# Patient Record
Sex: FEMALE | Race: WHITE | Employment: FULL TIME | URBAN - METROPOLITAN AREA
[De-identification: names, ages, dates, MRNs, and addresses within clinical notes are randomized per-mention and may not be internally consistent; named-entity substitution may affect disease eponyms.]

---

## 2017-02-02 ENCOUNTER — TRANSFERRED RECORDS (OUTPATIENT)
Dept: HEALTH INFORMATION MANAGEMENT | Facility: CLINIC | Age: 42
End: 2017-02-02

## 2017-02-02 LAB
HPV ABSTRACT: NORMAL
PAP-ABSTRACT: NORMAL

## 2018-04-16 ENCOUNTER — OFFICE VISIT (OUTPATIENT)
Dept: FAMILY MEDICINE | Facility: CLINIC | Age: 43
End: 2018-04-16
Payer: COMMERCIAL

## 2018-04-16 VITALS
OXYGEN SATURATION: 100 % | BODY MASS INDEX: 28.13 KG/M2 | TEMPERATURE: 98.3 F | DIASTOLIC BLOOD PRESSURE: 78 MMHG | SYSTOLIC BLOOD PRESSURE: 116 MMHG | HEIGHT: 64 IN | RESPIRATION RATE: 15 BRPM | WEIGHT: 164.8 LBS | HEART RATE: 78 BPM

## 2018-04-16 DIAGNOSIS — F41.1 GAD (GENERALIZED ANXIETY DISORDER): ICD-10-CM

## 2018-04-16 DIAGNOSIS — N95.1 PERIMENOPAUSE: Primary | ICD-10-CM

## 2018-04-16 DIAGNOSIS — Z17.0 MALIGNANT NEOPLASM OF UPPER-OUTER QUADRANT OF RIGHT BREAST IN FEMALE, ESTROGEN RECEPTOR POSITIVE (H): ICD-10-CM

## 2018-04-16 DIAGNOSIS — C50.411 MALIGNANT NEOPLASM OF UPPER-OUTER QUADRANT OF RIGHT BREAST IN FEMALE, ESTROGEN RECEPTOR POSITIVE (H): ICD-10-CM

## 2018-04-16 PROCEDURE — 99202 OFFICE O/P NEW SF 15 MIN: CPT | Performed by: FAMILY MEDICINE

## 2018-04-16 RX ORDER — CITALOPRAM HYDROBROMIDE 20 MG/1
TABLET ORAL
COMMUNITY
Start: 2015-05-06 | End: 2018-04-16

## 2018-04-16 RX ORDER — BUPROPION HYDROCHLORIDE 150 MG/1
TABLET ORAL
Refills: 3 | COMMUNITY
Start: 2018-04-08

## 2018-04-16 RX ORDER — GABAPENTIN 300 MG/1
300 CAPSULE ORAL AT BEDTIME
Qty: 90 CAPSULE | Refills: 3 | Status: SHIPPED | OUTPATIENT
Start: 2018-04-16

## 2018-04-16 RX ORDER — CITALOPRAM HYDROBROMIDE 10 MG/1
30 TABLET ORAL DAILY
Qty: 90 TABLET | Refills: 3 | COMMUNITY
Start: 2018-04-16

## 2018-04-16 NOTE — Clinical Note
Pt had pap done on this date 02/02/2017, with [place] [facility] were normal. Please abstract.

## 2018-04-16 NOTE — PROGRESS NOTES
SUBJECTIVE:   Kelle Burks is a 42 year old female who presents to clinic today for the following health issues:      Chief Complaint   Patient presents with     Consult     Would like to meet you- has new insurance and is looking for a new GP.  Would like to get Rx for Gabapentin for hot flashes.  She has been given this before due to Chemo induced menopause.     She is here to establish care.  Has had her routine Pap smears through her OB/GYN at [facility].  Her last Pap smear was normal and is noted below she is up-to-date on all other routine well adult cares.    History of breast cancer on the right side.  She did have lumpectomy with no regional lymph nodes had radiation and chemotherapy and 5-6 years of tamoxifen and now is off of all medications but unfortunately is having menopausal symptoms as a result.  Gabapentin has worked well for her.  She usually has taken 300 mg at night.  She has regular breast screening every 6 months to a year.      PAP TEST (02/02/2017 2:43 PM)  PAP TEST (02/02/2017 2:43 PM)   Component Value Ref Range   Pap Final   [facility]                                                  Accession Number:   [id number]        Source:  ThinPrep liquid-based Pap test      Final Interpretation:  NEGATIVE FOR INTRAEPITHELIAL LESION OR MALIGNANCY    High-risk HPV co-testing will be performed and separately reported by the  [facility].    *  Report Electronically Signed By  *     Jayla Hyde MBA, CT(ASCP)     Screening Performed By:     Jayla Hyde MBA, CT(ASCP)      02.06.2017 8:54      Diagnosis Comment:  _  This Pap test was screened by the ThinPrep Imaging System ([employer]  prior to manual review by a cytotechnologist and/or pathologist.  -  This Pap test is a screening test with inherent false negative and false  positive results and should not be used as the sole means to detect  cervical cancer.      Adequacy Interpretation:  Satisfactory for  "evaluation, endocervical component identified.              Problem list and histories reviewed & adjusted, as indicated.  Additional history: as documented    Patient Active Problem List   Diagnosis     Perimenopause     Malignant neoplasm of upper-outer quadrant of right breast in female, estrogen receptor positive (H)     TJ (generalized anxiety disorder)     Past Surgical History:   Procedure Laterality Date     CYSTECTOMY OVARIAN BENIGN  2001     LUMPECTOMY BREAST  2010       Social History   Substance Use Topics     Smoking status: Never Smoker     Smokeless tobacco: Never Used     Alcohol use Yes      Comment: socially     History reviewed. No pertinent family history.      Current Outpatient Prescriptions   Medication Sig Dispense Refill     buPROPion (WELLBUTRIN XL) 150 MG 24 hr tablet TK 3 TS PO ONCE DAILY  3     gabapentin (NEURONTIN) 300 MG capsule Take 1 capsule (300 mg) by mouth At Bedtime 90 capsule 3     citalopram (CELEXA) 10 MG tablet Take 3 tablets (30 mg) by mouth daily 90 tablet 3     [DISCONTINUED] citalopram (CELEXA) 20 MG tablet TAKE 1 TABLET BY MOUTH DAILY       [DISCONTINUED] BuPROPion HCl (WELLBUTRIN PO) Take 300 mg by mouth       [DISCONTINUED] Citalopram Hydrobromide (CELEXA PO) Take 20 mg by mouth         Reviewed and updated as needed this visit by clinical staff  Tobacco  Meds  Soc Hx      Reviewed and updated as needed this visit by Provider         ROS:  Constitutional, HEENT, cardiovascular, pulmonary, gi and gu systems are negative, except as otherwise noted.    OBJECTIVE:                                                    /78  Pulse 78  Temp 98.3  F (36.8  C) (Oral)  Resp 15  Ht 5' 4\" (1.626 m)  Wt 164 lb 12.8 oz (74.8 kg)  LMP 02/16/2018  SpO2 100%  Breastfeeding? No  BMI 28.29 kg/m2  Body mass index is 28.29 kg/(m^2).  GENERAL APPEARANCE: healthy, alert and no distress         ASSESSMENT/PLAN:                                                    1. " Perimenopause    - gabapentin (NEURONTIN) 300 MG capsule; Take 1 capsule (300 mg) by mouth At Bedtime  Dispense: 90 capsule; Refill: 3    2. Malignant neoplasm of upper-outer quadrant of right breast in female, estrogen receptor positive (H)      3. TJ (generalized anxiety disorder)  Meds reviewd and updated - sees psychiatry      Follow up with Provider - as needed for WWE later this year     Kelle López MD  [facility]

## 2018-04-16 NOTE — MR AVS SNAPSHOT
"              After Visit Summary   2018    Kelle Burks    MRN: [id number]           Patient Information     Date Of Birth          1975        Visit Information        Provider Department      2018 3:00 PM Kelle López MD [facility]        Today's Diagnoses     Perimenopause    -  1    Malignant neoplasm of upper-outer quadrant of right breast in female, estrogen receptor positive (H)        TJ (generalized anxiety disorder)           Follow-ups after your visit        Who to contact     If you have questions or need follow up information about today's clinic visit or your schedule please contact [facility] directly at [phone].  Normal or non-critical lab and imaging results will be communicated to you by MyChart, letter or phone within 4 business days after the clinic has received the results. If you do not hear from us within 7 days, please contact the clinic through MyChart or phone. If you have a critical or abnormal lab result, we will notify you by phone as soon as possible.  Submit refill requests through [employer] or call your pharmacy and they will forward the refill request to us. Please allow 3 business days for your refill to be completed.          Additional Information About Your Visit        MyChart Information     [employer] lets you send messages to your doctor, view your test results, renew your prescriptions, schedule appointments and more. To sign up, go to [email or web address] . Click on \"Log in\" on the left side of the screen, which will take you to the Welcome page. Then click on \"Sign up Now\" on the right side of the page.     You will be asked to enter the access code listed below, as well as some personal information. Please follow the directions to create your username and password.     Your access code is: [email or web address]  Expires: 7/15/2018  3:51 PM     Your access code will  in 90 days. If you need help or a new code, please " "call your [facility] or [phone].        Care EveryWhere ID     This is your Care EveryWhere ID. This could be used by other organizations to access your [facility] medical records  [id number]        Your Vitals Were     Pulse Temperature Respirations Height Last Period Pulse Oximetry    78 98.3  F (36.8  C) (Oral) 15 5' 4\" (1.626 m) 02/16/2018 100%    Breastfeeding? BMI (Body Mass Index)                No 28.29 kg/m2           Blood Pressure from Last 3 Encounters:   04/16/18 116/78   12/13/14 113/79    Weight from Last 3 Encounters:   04/16/18 164 lb 12.8 oz (74.8 kg)   12/13/14 152 lb (68.9 kg)              Today, you had the following     No orders found for display         Today's Medication Changes          These changes are accurate as of 4/16/18  3:51 PM.  If you have any questions, ask your nurse or doctor.               Start taking these medicines.        Dose/Directions    gabapentin 300 MG capsule   Commonly known as:  NEURONTIN   Used for:  Perimenopause   Started by:  Kelle López MD        Dose:  300 mg   Take 1 capsule (300 mg) by mouth At Bedtime   Quantity:  90 capsule   Refills:  3            Where to get your medicines      These medications were sent to [facility] [address] [place] [address] AT [address] [address]    Hours:  24-hours Phone:  [phone]     gabapentin 300 MG capsule                Primary Care Provider Office Phone # Fax #    [facility] [phone]       [facility] [id number]  [facility] [address]        Equal Access to Services     ABEL ROLDAN AH: Hadii phuc alvares Sopaty, waaxda luqvega, qaybta kaalmadaniel singh. So [facility] [phone].    ATENCIÓN: Si habla español, tiene a sinha disposición servicios gratuitos de asistencia lingüística. Llame al [phone].    We comply with applicable federal civil rights laws and " [place] laws. We do not discriminate on the basis of race, color, national origin, age, disability, sex, sexual orientation, or gender identity.            Thank you!     Thank you for choosing [facility]  for your care. Our goal is always to provide you with excellent care. Hearing back from our patients is one way we can continue to improve our services. Please take a few minutes to complete the written survey that you may receive in the mail after your visit with us. Thank you!             Your Updated Medication List - Protect others around you: Learn how to safely use, store and throw away your medicines at www.disposemymeds.org.          This list is accurate as of 4/16/18  3:51 PM.  Always use your most recent med list.                   Brand Name Dispense Instructions for use Diagnosis    buPROPion 150 MG 24 hr tablet    WELLBUTRIN XL     TK 3 TS PO ONCE DAILY        citalopram 10 MG tablet    celeXA    90 tablet    Take 3 tablets (30 mg) by mouth daily        gabapentin 300 MG capsule    NEURONTIN    90 capsule    Take 1 capsule (300 mg) by mouth At Bedtime    Perimenopause

## 2018-04-16 NOTE — NURSING NOTE
"Chief Complaint   Patient presents with     Consult     Would like to meet you- has new insurance and is looking for a new GP.  Would like to get Rx for Gabapentin for hot flashes.  She has been given this before due to Chemo induced menopause.       Initial /78  Pulse 78  Temp 98.3  F (36.8  C) (Oral)  Resp 15  Ht 5' 4\" (1.626 m)  Wt 164 lb 12.8 oz (74.8 kg)  LMP 02/16/2018  SpO2 100%  Breastfeeding? No  BMI 28.29 kg/m2 Estimated body mass index is 28.29 kg/(m^2) as calculated from the following:    Height as of this encounter: 5' 4\" (1.626 m).    Weight as of this encounter: 164 lb 12.8 oz (74.8 kg).  Medication Reconciliation: complete    Health Maintenance Due Pending Provider Review:  Pap Smear      Lory Almodovar MA  [facility]  "